# Patient Record
Sex: FEMALE | URBAN - METROPOLITAN AREA
[De-identification: names, ages, dates, MRNs, and addresses within clinical notes are randomized per-mention and may not be internally consistent; named-entity substitution may affect disease eponyms.]

---

## 2020-07-14 ENCOUNTER — APPOINTMENT (RX ONLY)
Dept: URBAN - METROPOLITAN AREA CLINIC 48 | Facility: CLINIC | Age: 50
Setting detail: DERMATOLOGY
End: 2020-07-14

## 2020-07-14 DIAGNOSIS — Z41.9 ENCOUNTER FOR PROCEDURE FOR PURPOSES OTHER THAN REMEDYING HEALTH STATE, UNSPECIFIED: ICD-10-CM

## 2020-07-14 PROCEDURE — ? BOTOX

## 2020-07-14 ASSESSMENT — LOCATION DETAILED DESCRIPTION DERM
LOCATION DETAILED: LEFT SUPERIOR FOREHEAD
LOCATION DETAILED: RIGHT FOREHEAD
LOCATION DETAILED: RIGHT SUPERIOR FOREHEAD
LOCATION DETAILED: LEFT FOREHEAD
LOCATION DETAILED: SUPERIOR MID FOREHEAD
LOCATION DETAILED: RIGHT MEDIAL FOREHEAD
LOCATION DETAILED: RIGHT INFERIOR TEMPLE
LOCATION DETAILED: LEFT INFERIOR TEMPLE
LOCATION DETAILED: RIGHT LATERAL CANTHUS
LOCATION DETAILED: LEFT MEDIAL FOREHEAD

## 2020-07-14 ASSESSMENT — LOCATION SIMPLE DESCRIPTION DERM
LOCATION SIMPLE: SUPERIOR FOREHEAD
LOCATION SIMPLE: RIGHT TEMPLE
LOCATION SIMPLE: RIGHT EYELID
LOCATION SIMPLE: LEFT FOREHEAD
LOCATION SIMPLE: RIGHT FOREHEAD
LOCATION SIMPLE: LEFT TEMPLE

## 2020-07-14 ASSESSMENT — LOCATION ZONE DERM
LOCATION ZONE: EYELID
LOCATION ZONE: FACE

## 2020-07-14 NOTE — PROCEDURE: BOTOX
Lateral Platysmal Bands Units: 0
Show Topical Anesthesia: Yes
Show Right And Left Pupillary Line Units: No
Additional Area 2 Location: eyebrow
Dilution (U/0.1 Cc): 3.5
Periorbital Skin Units: 12
Post-Care Instructions: Patient instructed to not lie down for 4 hours and limit physical activity for 24 hours. Patient instructed not to travel by airplane for 48 hours.
Detail Level: Detailed
Lot #: [id number]
Expiration Date (Month Year): 01/2023
Additional Area 1 Location: lip
Consent: Written consent obtained. Risks include but not limited to lid/brow ptosis, bruising, swelling, diplopia, temporary effect, incomplete chemical denervation.

## 2020-10-26 ENCOUNTER — APPOINTMENT (RX ONLY)
Dept: URBAN - METROPOLITAN AREA CLINIC 48 | Facility: CLINIC | Age: 50
Setting detail: DERMATOLOGY
End: 2020-10-26

## 2020-10-26 DIAGNOSIS — Z41.9 ENCOUNTER FOR PROCEDURE FOR PURPOSES OTHER THAN REMEDYING HEALTH STATE, UNSPECIFIED: ICD-10-CM

## 2020-10-26 PROCEDURE — ? RESTYLANE LYFT INJECTION

## 2020-10-26 ASSESSMENT — LOCATION SIMPLE DESCRIPTION DERM
LOCATION SIMPLE: RIGHT CHEEK
LOCATION SIMPLE: LEFT CHEEK

## 2020-10-26 ASSESSMENT — LOCATION DETAILED DESCRIPTION DERM
LOCATION DETAILED: LEFT CENTRAL MALAR CHEEK
LOCATION DETAILED: RIGHT CENTRAL MALAR CHEEK

## 2020-10-26 ASSESSMENT — LOCATION ZONE DERM: LOCATION ZONE: FACE

## 2020-10-26 NOTE — PROCEDURE: RESTYLANE LYFT INJECTION
Lateral Face Filler  Volume In Cc: 0
Procedural Text: The filler was administered to the treatment areas noted above.
Lot #: [id number]
Detail Level: Detailed
Use Map Statement For Sites (Optional): No
Additional Anesthesia Volume In Cc: 6
Anesthesia Volume In Cc: 0.5
Consent: Written consent obtained. Risks include but not limited to bruising, beading, irregular texture, ulceration, infection, allergic reaction, scar formation, incomplete augmentation, temporary nature, procedural pain.
Number Of Syringes (Required For Inventory): 1
Expiration Date (Month Year): 2022-11-30
Post-Care Instructions: Patient instructed to apply ice to reduce swelling.
Map Statement: See [address] for Complete Details
Filler: Jonah Douglass
Anesthesia Type: 1% lidocaine with epinephrine

## 2020-11-05 ENCOUNTER — APPOINTMENT (RX ONLY)
Dept: URBAN - METROPOLITAN AREA CLINIC 48 | Facility: CLINIC | Age: 50
Setting detail: DERMATOLOGY
End: 2020-11-05

## 2020-11-05 DIAGNOSIS — Z41.9 ENCOUNTER FOR PROCEDURE FOR PURPOSES OTHER THAN REMEDYING HEALTH STATE, UNSPECIFIED: ICD-10-CM

## 2020-11-05 PROCEDURE — ? COUNSELING

## 2020-11-05 PROCEDURE — ? POST-OP WOUND CHECK

## 2020-11-05 PROCEDURE — 99024 POSTOP FOLLOW-UP VISIT: CPT

## 2020-11-05 ASSESSMENT — LOCATION DETAILED DESCRIPTION DERM: LOCATION DETAILED: RIGHT INFERIOR CENTRAL MALAR CHEEK

## 2020-11-05 ASSESSMENT — LOCATION ZONE DERM: LOCATION ZONE: FACE

## 2020-11-05 ASSESSMENT — LOCATION SIMPLE DESCRIPTION DERM: LOCATION SIMPLE: RIGHT CHEEK

## 2020-11-05 NOTE — PROCEDURE: POST-OP WOUND CHECK
Body Location Override (Optional - Billing Will Still Be Based On Selected Body Map Location If Applicable): right cheek
Detail Level: Detailed
Add 90537 Cpt? (Important Note: In 2017 The Use Of 53728 Is Being Tracked By Cms To Determine Future Global Period Reimbursement For Global Periods): yes
Wound Assessment Override (Optional): bruised
Additional Comments: Patient educated, recommended OTC arnica gel or tablets for bruising on right cheek.

## 2020-11-05 NOTE — PROCEDURE: MIPS QUALITY
Detail Level: Detailed
Quality 130: Documentation Of Current Medications In The Medical Record: Current Medications Documented
Quality 110: Preventive Care And Screening: Influenza Immunization: Influenza immunization was not ordered or administered, reason not given
Quality 431: Preventive Care And Screening: Unhealthy Alcohol Use - Screening: Patient screened for unhealthy alcohol use using a single question and scores less than 2 times per year
Quality 134: Screening For Clinical Depression And Follow-Up Plan: Depression Screening not documented, reason not given
Quality 226: Preventive Care And Screening: Tobacco Use: Screening And Cessation Intervention: Tobacco Screening not Performed for Unknown Reasons